# Patient Record
Sex: FEMALE | Race: BLACK OR AFRICAN AMERICAN | Employment: FULL TIME | URBAN - METROPOLITAN AREA
[De-identification: names, ages, dates, MRNs, and addresses within clinical notes are randomized per-mention and may not be internally consistent; named-entity substitution may affect disease eponyms.]

---

## 2023-03-01 ENCOUNTER — HOSPITAL ENCOUNTER (EMERGENCY)
Age: 28
Discharge: HOME | End: 2023-03-01
Attending: EMERGENCY MEDICINE

## 2023-03-01 VITALS
RESPIRATION RATE: 18 BRPM | DIASTOLIC BLOOD PRESSURE: 59 MMHG | OXYGEN SATURATION: 100 % | SYSTOLIC BLOOD PRESSURE: 122 MMHG | HEART RATE: 111 BPM | TEMPERATURE: 97.5 F

## 2023-03-01 DIAGNOSIS — T30.0 BURN: Primary | ICD-10-CM

## 2023-03-01 PROCEDURE — 74011000250 HC RX REV CODE- 250: Performed by: NURSE PRACTITIONER

## 2023-03-01 PROCEDURE — 74011250637 HC RX REV CODE- 250/637: Performed by: NURSE PRACTITIONER

## 2023-03-01 PROCEDURE — 99283 EMERGENCY DEPT VISIT LOW MDM: CPT

## 2023-03-01 RX ORDER — BACITRACIN 500 UNIT/G
1 PACKET (EA) TOPICAL ONCE
Status: COMPLETED | OUTPATIENT
Start: 2023-03-01 | End: 2023-03-01

## 2023-03-01 RX ORDER — NAPROXEN 500 MG/1
500 TABLET ORAL 2 TIMES DAILY WITH MEALS
Qty: 20 TABLET | Refills: 0 | Status: SHIPPED | OUTPATIENT
Start: 2023-03-01 | End: 2023-03-11

## 2023-03-01 RX ORDER — NAPROXEN 250 MG/1
500 TABLET ORAL ONCE
Status: COMPLETED | OUTPATIENT
Start: 2023-03-01 | End: 2023-03-01

## 2023-03-01 RX ORDER — BACITRACIN 500 [USP'U]/G
OINTMENT TOPICAL 3 TIMES DAILY
Qty: 1 EACH | Refills: 0 | Status: SHIPPED | OUTPATIENT
Start: 2023-03-01 | End: 2023-03-11

## 2023-03-01 RX ADMIN — Medication 1 PACKET: at 18:12

## 2023-03-01 RX ADMIN — NAPROXEN 500 MG: 250 TABLET ORAL at 18:12

## 2023-03-01 NOTE — ED TRIAGE NOTES
Pt arrives ambulatory to the ER with CC right hand burned on Sunday from heating food in the microwave. Pt applied cortisone 10 and wrapped it. Pt decided to come to the ER today because it was more enflamed with blistering.

## 2023-03-01 NOTE — ED NOTES
Discharge instructions given to patient by NP and RN. Pt has been given counseling regarding at home treatment plan. Pt verbalizes understanding of need to seek further treatment if symptoms worsen. Pt ambulated off of unit in no signs of distress.

## 2023-03-01 NOTE — ED PROVIDER NOTES
31 y/o female presents to the er for evaluation of a right wrist burn that occurred this past Sunday. Patient states that she was taking ravioli out of the microwave and sustained the burn to the right dorsal wrist. Right hand dominant, states that she applied hydrocortisone 10 and placed a dressing. Patient came today for increased pain and noted blisters. Patient states that her tetanus vaccination was updated in the summer 2022, states that she is currently here attending college to finish up her masters degree. Patient denies any fever chills, chest pain, shortness of breath, feeling dizzy or lightheaded. Patient states she just feels slightly anxious from being in the ER. No past medical history on file. No past surgical history on file. No family history on file. Social History     Socioeconomic History    Marital status: Not on file     Spouse name: Not on file    Number of children: Not on file    Years of education: Not on file    Highest education level: Not on file   Occupational History    Not on file   Tobacco Use    Smoking status: Not on file    Smokeless tobacco: Not on file   Substance and Sexual Activity    Alcohol use: Not on file    Drug use: Not on file    Sexual activity: Not on file   Other Topics Concern    Not on file   Social History Narrative    Not on file     Social Determinants of Health     Financial Resource Strain: Not on file   Food Insecurity: Not on file   Transportation Needs: Not on file   Physical Activity: Not on file   Stress: Not on file   Social Connections: Not on file   Intimate Partner Violence: Not on file   Housing Stability: Not on file         ALLERGIES: Patient has no known allergies. Review of Systems   Constitutional:  Negative for chills and fever. Respiratory: Negative. Cardiovascular: Negative. Skin:  Positive for wound. Right dorsal wrist, 2 quarter size open blisters and 3 small intact blisters.      Vitals:    03/01/23 1803   BP: (!) 122/59   Pulse: (!) 111   Resp: 18   Temp: 97.5 °F (36.4 °C)   SpO2: 100%            Physical Exam  Vitals and nursing note reviewed. Constitutional:       Appearance: Normal appearance. HENT:      Head: Normocephalic. Nose: Nose normal.   Cardiovascular:      Rate and Rhythm: Normal rate. Pulmonary:      Effort: Pulmonary effort is normal. No respiratory distress. Abdominal:      General: There is no distension. Musculoskeletal:         General: Normal range of motion. Right hand: Tenderness present. Normal strength. Normal sensation. There is no disruption of two-point discrimination. Normal capillary refill. Normal pulse. Hands:       Cervical back: Normal range of motion. Comments: 2 quarter sized open blisters, tender to touch, 3 small blisters intact. No abnormal erythema, superficial burns. Skin:     General: Skin is warm and dry. Capillary Refill: Capillary refill takes less than 2 seconds. Findings: Burn present. Neurological:      Mental Status: She is alert and oriented to person, place, and time. Psychiatric:         Mood and Affect: Mood normal.        Medical Decision Making  Differential Dx: burn vs cellulitis vs first degree burn    1. Previous notes (external to Emergency room) were reviewed: none    2. History was obtained by: patient    3. Chronic medical issues affecting this visit:   none    4. I ordered the following tests, followed by review of final reads by lab and radiology: none    5. I considered ordering: none    6. Medical intervention: wound cleaning, bacitracin, tefla and coban      Surgical intervention: none    7. Social determinants of health: lack of pcp, [place]    8 Final diagnosis: burn. Medications prescribed: naprosyn and bacitracin    9. Disposition: discharged home.    Wound care performed by me to the right dorsal wrist.  Cleansed with Shur-Clens, patted dry with sterile 4 x 4 gauze, bacitracin applied with Des and Coban. Patient tolerated without difficulty. RN provided naproxen. Discussed plan with patient to continue with same dressing 3 times daily, discussed prescription for naproxen and bacitracin, follow[facility]up with the [facility], advised to take the naproxen consistently 2 times daily to help with pain. Return precautions provided for worsening of symptoms, signs of symptoms of infection. Patient is hemodynamically stable, heart rate was palpated and regular, patient states that she was just feels a little anxious from being in the ER, denies any chest pain, shortness of breath, feeling dizzy or lightheaded. Patient verbalized understanding for follow-up and agrees with plan. On assessment of burn to the right dorsal wrist, superficial with 2 blisters that have opened with 3 small blisters intact, no signs of cellulitis, wound is otherwise clean and dry. Patient states that her tetanus vaccination was updated in the summer 2022, patient is originally from [place] and here at [facility] going to school for her masters. Problems Addressed:  Burn: acute illness or injury    Risk  OTC drugs. Prescription drug management. Procedures  VITAL SIGNS:  Patient Vitals for the past 4 hrs:   Temp Pulse Resp BP SpO2   03/01/23 1803 97.5 °F (36.4 °C) (!) 111 18 (!) 122/59 100 %           LABS:  The Following labs have been ordered while in the emergency department and I have independently evaluated them. No results found for this or any previous visit (from the past 6 hour(s)). IMAGING:  The Following imaging studies have been ordered while in the emergency department and I have reviewed them. No orders to display         Medications During Visit:  I ordered/approved the ordering of the following medicines for the patient while in the emergency department.     Medications   bacitracin 500 unit/gram packet 1 Packet (1 Packet Topical Given 3/1/23 1812) naproxen (NAPROSYN) tablet 500 mg (500 mg Oral Given 3/1/23 1812)       IMPRESSION:  1. Burn        DISPOSITION:  Discharged      Current Discharge Medication List        START taking these medications    Details   bacitracin (BACITRACIN) 500 unit/gram oint Apply  to affected area three (3) times daily for 10 days. Apply to affected area  Qty: 1 Each, Refills: 0  Start date: 3/1/2023, End date: 3/11/2023      naproxen (Naprosyn) 500 mg tablet Take 1 Tablet by mouth two (2) times daily (with meals) for 10 days. Qty: 20 Tablet, Refills: 0  Start date: 3/1/2023, End date: 3/11/2023              Follow-up Information       Follow up With Specialties Details Why Contact Info    Follow up with Primary Care [facility]                  The patient is asked to follow-up with their primary care provider in the next several days. They are to call tomorrow for an appointment. The patient is asked to return promptly for any increased concerns or worsening of symptoms. They can return to this emergency department or any other emergency department.     Joya Chandler Pod, NP  6:36 PM

## 2023-03-01 NOTE — DISCHARGE INSTRUCTIONS
Apply bacitracin 3 times daily, apply non-stick dressing with coban. Keep clean, any sign of infection, fever chills, return to the ER. Follow up with primary care. Take the Naprosyn twice daily to help with pain.